# Patient Record
Sex: FEMALE | Race: WHITE | NOT HISPANIC OR LATINO | URBAN - METROPOLITAN AREA
[De-identification: names, ages, dates, MRNs, and addresses within clinical notes are randomized per-mention and may not be internally consistent; named-entity substitution may affect disease eponyms.]

---

## 2018-09-18 ENCOUNTER — APPOINTMENT (RX ONLY)
Dept: URBAN - METROPOLITAN AREA CLINIC 91 | Facility: CLINIC | Age: 53
Setting detail: DERMATOLOGY
End: 2018-09-18

## 2018-09-18 DIAGNOSIS — Z72.0 TOBACCO USE: ICD-10-CM

## 2018-09-18 DIAGNOSIS — D22 MELANOCYTIC NEVI: ICD-10-CM

## 2018-09-18 PROBLEM — E78.5 HYPERLIPIDEMIA, UNSPECIFIED: Status: ACTIVE | Noted: 2018-09-18

## 2018-09-18 PROBLEM — D48.5 NEOPLASM OF UNCERTAIN BEHAVIOR OF SKIN: Status: ACTIVE | Noted: 2018-09-18

## 2018-09-18 PROBLEM — I10 ESSENTIAL (PRIMARY) HYPERTENSION: Status: ACTIVE | Noted: 2018-09-18

## 2018-09-18 PROBLEM — E03.9 HYPOTHYROIDISM, UNSPECIFIED: Status: ACTIVE | Noted: 2018-09-18

## 2018-09-18 PROBLEM — D22.9 MELANOCYTIC NEVI, UNSPECIFIED: Status: ACTIVE | Noted: 2018-09-18

## 2018-09-18 PROBLEM — D23.71 OTHER BENIGN NEOPLASM OF SKIN OF RIGHT LOWER LIMB, INCLUDING HIP: Status: ACTIVE | Noted: 2018-09-18

## 2018-09-18 PROCEDURE — ? COUNSELING

## 2018-09-18 PROCEDURE — ? PRESCRIPTION

## 2018-09-18 PROCEDURE — 11100: CPT

## 2018-09-18 PROCEDURE — 99203 OFFICE O/P NEW LOW 30 MIN: CPT | Mod: 25

## 2018-09-18 PROCEDURE — ? BIOPSY BY SHAVE METHOD

## 2018-09-18 PROCEDURE — ? SUNSCREEN RECOMMENDATIONS

## 2018-09-18 PROCEDURE — ? ADDITIONAL NOTES

## 2018-09-18 RX ORDER — MUPIROCIN 20 MG/G
OINTMENT TOPICAL
Qty: 1 | Refills: 1 | Status: ERX | COMMUNITY
Start: 2018-09-18

## 2018-09-18 RX ADMIN — MUPIROCIN: 20 OINTMENT TOPICAL at 00:00

## 2018-09-18 NOTE — PROCEDURE: BIOPSY BY SHAVE METHOD
Dressing: Band-Aid
Size Of Lesion In Cm: 0.3
Anesthesia Volume In Cc (Will Not Render If 0): 0.2
Render Post-Care Instructions In Note?: yes
Hemostasis: Aluminum Chloride
Detail Level: Detailed
Electrodesiccation And Curettage Text: The wound bed was treated with electrodesiccation and curettage after the biopsy was performed.
Depth Of Biopsy: dermis
Silver Nitrate Text: The wound bed was treated with silver nitrate after the biopsy was performed.
Wound Care: Mupirocin
Cryotherapy Text: The wound bed was treated with cryotherapy after the biopsy was performed.
Bill [id number] For Specimen Handling/Conveyance To Laboratory?: no
Body Location Override (Optional - Billing Will Still Be Based On Selected Body Map Location If Applicable): right chin
Post-Care Instructions: I reviewed with the patient in detail post-care instructions. Patient is to keep the biopsy site dry overnight, and then apply bactroban twice daily until healed. Patient may apply hydrogen peroxide soaks to remove any crusting.
Lab: 3
Type Of Destruction Used: Curettage
Additional Anesthesia Volume In Cc (Will Not Render If 0): 0
Notification Instructions: Patient may RTC in 2 weeks if they would like to receive biopsy results in person.  Otherwise, patient is instructed to call the office if not contacted within 2  - 3 weeks.
Consent: Written consent was obtained and risks were reviewed including but not limited to scarring, infection, bleeding, scabbing, incomplete removal, nerve damage and allergy to anesthesia.
Biopsy Method: Dermablade
Electrodesiccation Text: The wound bed was treated with electrodesiccation after the biopsy was performed.
Anesthesia Type: 2% lidocaine without epinephrine
Curettage Text: The wound bed was treated with curettage after the biopsy was performed.
Billing Type: Third-Party Bill
Biopsy Type: H and E
Lab Facility: 1

## 2018-09-18 NOTE — PROCEDURE: ADDITIONAL NOTES
Additional Notes: Tobacco/nicotine dependence is a condition that often requires repeated treatments, but there are helpful treatments and resources for quitting. Smokers can and do quit smoking. In fact, today there are more former smokers than current smokers.  People who stop smoking often start again because of withdrawal symptoms, stress, and weight gain.\\nNicotine withdrawal symptoms may include:4,6\\nFeeling irritable, angry, or anxious\\nHaving trouble thinking\\nCraving tobacco products\\nFeeling hungrier than usual\\n\\nYou are never too old to quit.\\n\\n                              Ways to Quit Smoking\\nMost former smokers quit without using one of the treatments that scientific research has shown can work.  However, the following treatments are proven to be effective for smokers who want help to quit:   Brief help by a doctor (such as when a doctor takes 10 minutes or less to give a patient advice and assistance about quitting)\\nIndividual, group, or telephone counseling\\nBehavioral therapies (such as training in problem solving)\\nTreatments with more person-to-person contact and more intensity (such as more or longer counseling sessions)\\nPrograms to deliver treatments using mobile phones\\nMedications for quitting that have been found to be effective include the following:\\nNicotine replacement products\\nOver-the-counter (nicotine patch [which is also available by prescription], gum, lozenge)\\nPrescription (nicotine patch, inhaler, nasal spray)\\nPrescription non-nicotine medications: bupropion SR (Zyban®), varenicline tartrate (Chantix®)\\nCounseling and medication are both effective for treating tobacco dependence, and using them together is more effective than using either one alone.\\nMore information is needed about quitting for people who smoke cigarettes and also use other types of tobacco.\\n\\nHelpful Resources\\nQuitline Services\\nCall 1-800-QUIT-NOW (4-088-104-0215) if you want help quitting. This is a free telephone support service that can help people who want to stop smoking or using tobacco. Callers are routed to their state quitlines, which offer several types of quit information and services. These may include:\\n\\nFree support, advice, and counseling from experienced quitline coaches\\nA personalized quit plan\\nPractical information on how to quit, including ways to cope with nicotine withdrawal\\nThe latest information about stop-smoking medications\\nFree or discounted medications (available for at least some callers in most states)\\nReferrals to other resources\\nMailed self-help materials\\nOnline Help\\nGet free help online, too.\\n\\nFor information on quitting, go to the Quit Smoking Resources page on CDC’s Smoking & Tobacco Use Web site.\\nRead inspiring stories about former smokers and their reasons for quitting at CDC’s Tips From Former Smokers Web site.\\nThe I’m Ready to Quit! page links to many helpful resources.\\nPublications\\nVisit [employer]’s Online Publications Catalog to order free copies of materials about quitting as well as helpful resources about tobacco use prevention.\\n\\n\\n\\n Additional Notes: Tobacco/nicotine dependence is a condition that often requires repeated treatments, but there are helpful treatments and resources for quitting. Smokers can and do quit smoking. In fact, today there are more former smokers than current smokers.  People who stop smoking often start again because of withdrawal symptoms, stress, and weight gain.\\nNicotine withdrawal symptoms may include:4,6\\nFeeling irritable, angry, or anxious\\nHaving trouble thinking\\nCraving tobacco products\\nFeeling hungrier than usual\\n\\nYou are never too old to quit.\\n\\n                              Ways to Quit Smoking\\nMost former smokers quit without using one of the treatments that scientific research has shown can work.  However, the following treatments are proven to be effective for smokers who want help to quit:   Brief help by a doctor (such as when a doctor takes 10 minutes or less to give a patient advice and assistance about quitting)\\nIndividual, group, or telephone counseling\\nBehavioral therapies (such as training in problem solving)\\nTreatments with more person-to-person contact and more intensity (such as more or longer counseling sessions)\\nPrograms to deliver treatments using mobile phones\\nMedications for quitting that have been found to be effective include the following:\\nNicotine replacement products\\nOver-the-counter (nicotine patch [which is also available by prescription], gum, lozenge)\\nPrescription (nicotine patch, inhaler, nasal spray)\\nPrescription non-nicotine medications: bupropion SR (Zyban®), varenicline tartrate (Chantix®)\\nCounseling and medication are both effective for treating tobacco dependence, and using them together is more effective than using either one alone.\\nMore information is needed about quitting for people who smoke cigarettes and also use other types of tobacco.\\n\\nHelpful Resources\\nQuitline Services\\nCall 1-800-QUIT-NOW (1-402-360-8430) if you want help quitting. This is a free telephone support service that can help people who want to stop smoking or using tobacco. Callers are routed to their state quitlines, which offer several types of quit information and services. These may include:\\n\\nFree support, advice, and counseling from experienced quitline coaches\\nA personalized quit plan\\nPractical information on how to quit, including ways to cope with nicotine withdrawal\\nThe latest information about stop-smoking medications\\nFree or discounted medications (available for at least some callers in most states)\\nReferrals to other resources\\nMailed self-help materials\\nOnline Help\\nGet free help online, too.\\n\\nFor information on quitting, go to the Quit Smoking Resources page on CDC’s Smoking & Tobacco Use Web site.\\nRead inspiring stories about former smokers and their reasons for quitting at CDC’s Tips From Former Smokers Web site.\\nThe I’m Ready to Quit! page links to many helpful resources.\\nPublications\\nVisit [employer]’s Online Publications Catalog to order free copies of materials about quitting as well as helpful resources about tobacco use prevention.\\n\\n\\n\\n

## 2020-07-23 ENCOUNTER — APPOINTMENT (RX ONLY)
Dept: URBAN - METROPOLITAN AREA CLINIC 94 | Facility: CLINIC | Age: 55
Setting detail: DERMATOLOGY
End: 2020-07-23

## 2020-07-23 DIAGNOSIS — D485 NEOPLASM OF UNCERTAIN BEHAVIOR OF SKIN: ICD-10-CM

## 2020-07-23 PROBLEM — D37.01 NEOPLASM OF UNCERTAIN BEHAVIOR OF LIP: Status: ACTIVE | Noted: 2020-07-23

## 2020-07-23 PROCEDURE — 40490 BIOPSY OF LIP: CPT

## 2020-07-23 PROCEDURE — ? BIOPSY BY SHAVE METHOD

## 2020-07-23 PROCEDURE — ? FULL BODY SKIN EXAM - DECLINED

## 2020-07-23 ASSESSMENT — LOCATION DETAILED DESCRIPTION DERM: LOCATION DETAILED: RIGHT INFERIOR VERMILION LIP

## 2020-07-23 ASSESSMENT — LOCATION ZONE DERM: LOCATION ZONE: LIP

## 2020-07-23 ASSESSMENT — LOCATION SIMPLE DESCRIPTION DERM: LOCATION SIMPLE: RIGHT LIP

## 2023-01-19 ENCOUNTER — APPOINTMENT (RX ONLY)
Dept: URBAN - METROPOLITAN AREA CLINIC 90 | Facility: CLINIC | Age: 58
Setting detail: DERMATOLOGY
End: 2023-01-19

## 2023-01-19 DIAGNOSIS — L84 CORNS AND CALLOSITIES: ICD-10-CM

## 2023-01-19 DIAGNOSIS — D22 MELANOCYTIC NEVI: ICD-10-CM

## 2023-01-19 DIAGNOSIS — L82.1 OTHER SEBORRHEIC KERATOSIS: ICD-10-CM

## 2023-01-19 DIAGNOSIS — L91.8 OTHER HYPERTROPHIC DISORDERS OF THE SKIN: ICD-10-CM

## 2023-01-19 DIAGNOSIS — Z72.0 TOBACCO USE: ICD-10-CM

## 2023-01-19 PROBLEM — D23.71 OTHER BENIGN NEOPLASM OF SKIN OF RIGHT LOWER LIMB, INCLUDING HIP: Status: ACTIVE | Noted: 2023-01-19

## 2023-01-19 PROBLEM — D22.5 MELANOCYTIC NEVI OF TRUNK: Status: ACTIVE | Noted: 2023-01-19

## 2023-01-19 PROCEDURE — ? ADDITIONAL NOTES

## 2023-01-19 PROCEDURE — ? FULL BODY SKIN EXAM

## 2023-01-19 PROCEDURE — ? TREATMENT REGIMEN

## 2023-01-19 PROCEDURE — ? COUNSELING

## 2023-01-19 PROCEDURE — ? LIQUID NITROGEN

## 2023-01-19 PROCEDURE — ? COSMETIC SHAVE REMOVAL (NO PATHOLOGY)

## 2023-01-19 PROCEDURE — 17110 DESTRUCTION B9 LES UP TO 14: CPT | Mod: 52

## 2023-01-19 PROCEDURE — ? LIQUID NITROGEN (COSMETIC)

## 2023-01-19 PROCEDURE — 99213 OFFICE O/P EST LOW 20 MIN: CPT | Mod: 25

## 2023-01-19 ASSESSMENT — LOCATION ZONE DERM
LOCATION ZONE: TOE
LOCATION ZONE: TOE
LOCATION ZONE: ARM
LOCATION ZONE: TRUNK
LOCATION ZONE: FACE
LOCATION ZONE: FACE

## 2023-01-19 ASSESSMENT — LOCATION DETAILED DESCRIPTION DERM
LOCATION DETAILED: LEFT CENTRAL MALAR CHEEK
LOCATION DETAILED: RIGHT SUPERIOR UPPER BACK
LOCATION DETAILED: LEFT MEDIAL 5TH TOE
LOCATION DETAILED: LEFT CENTRAL MALAR CHEEK
LOCATION DETAILED: RIGHT DISTAL DORSAL FOREARM
LOCATION DETAILED: LEFT INFERIOR CENTRAL MALAR CHEEK
LOCATION DETAILED: LEFT MEDIAL UPPER BACK
LOCATION DETAILED: LEFT MEDIAL 5TH TOE

## 2023-01-19 ASSESSMENT — LOCATION SIMPLE DESCRIPTION DERM
LOCATION SIMPLE: LEFT CHEEK
LOCATION SIMPLE: LEFT CHEEK
LOCATION SIMPLE: LEFT UPPER BACK
LOCATION SIMPLE: RIGHT UPPER BACK
LOCATION SIMPLE: LEFT 5TH TOE
LOCATION SIMPLE: RIGHT FOREARM
LOCATION SIMPLE: LEFT 5TH TOE

## 2023-01-19 ASSESSMENT — PAIN INTENSITY VAS: HOW INTENSE IS YOUR PAIN 0 BEING NO PAIN, 10 BEING THE MOST SEVERE PAIN POSSIBLE?: NO PAIN

## 2023-01-19 NOTE — PROCEDURE: LIQUID NITROGEN
Aperture Size (Optional): C
Medical Necessity Information: It is in your best interest to select a reason for this procedure from the list below. All of these items fulfill various CMS LCD requirements except the new and changing color options.
Render Note In Bullet Format When Appropriate: Yes
Duration Of Freeze Thaw-Cycle (Seconds): 2
Detail Level: Detailed
Consent: The patient's consent was obtained including but not limited to risks of crusting, scabbing, blistering, scarring, darker or lighter pigmentary change, recurrence, incomplete removal and infection.  (see signed consent)
Medical Necessity Clause: This procedure was medically necessary because the lesions that were treated were:
Spray Paint Technique: No
Post-Care Instructions: I reviewed with the patient in detail post-care instructions. Patient is to wear sun protection and avoid picking at any of the treated lesions. Pt may apply Vaseline, Polysporin or Mupirocin ointment to crusted or scabbing areas if needed for dry, flaking skin.
Number Of Freeze-Thaw Cycles: 2 freeze-thaw cycles
Spray Paint Text: The liquid nitrogen was applied to the skin utilizing a spray paint frosting technique.

## 2023-01-19 NOTE — PROCEDURE: ADDITIONAL NOTES
Additional Notes: Helpful Resources\\nQuitline Services\\nCall 1-800-QUIT-NOW (1-522.841.4067) if you want help quitting. This is a free telephone support service that can help people who want to stop smoking or using tobacco. Callers are routed to their state quitlines, which offer several types of quit information and services. Additional Notes: Helpful Resources\\nQuitline Services\\nCall 1-800-QUIT-NOW (1-234.595.1408) if you want help quitting. This is a free telephone support service that can help people who want to stop smoking or using tobacco. Callers are routed to their state quitlines, which offer several types of quit information and services.

## 2023-01-19 NOTE — PROCEDURE: LIQUID NITROGEN (COSMETIC)
Show Spray Paint Technique Variable?: Yes
Spray Paint Text: The liquid nitrogen was applied to the skin utilizing a spray paint frosting technique.
Billing Information: Bill by Static Price
Spray Paint Technique: No
Post-Care Instructions: I reviewed with the patient in detail post-care instructions. Patient is to wear sunprotection, and avoid picking at any of the treated lesions. Pt may apply Vaseline to crusted or scabbing areas.
Price (Use Numbers Only, No Special Characters Or $): 0
Detail Level: Detailed
Consent: The patient's consent was obtained including but not limited to risks of crusting, scabbing, blistering, scarring, darker or lighter pigmentary change, recurrence, incomplete removal and infection. The patient understands that the procedure is cosmetic in nature and is not covered by insurance.